# Patient Record
Sex: FEMALE | URBAN - METROPOLITAN AREA
[De-identification: names, ages, dates, MRNs, and addresses within clinical notes are randomized per-mention and may not be internally consistent; named-entity substitution may affect disease eponyms.]

---

## 2024-08-07 PROBLEM — Z86.16 PERSONAL HISTORY OF COVID-19: Status: ACTIVE | Noted: 2024-08-07

## 2024-08-07 PROBLEM — F32.A DEPRESSION: Status: ACTIVE | Noted: 2024-08-07

## 2024-08-07 PROBLEM — F14.11 COCAINE ABUSE IN REMISSION (MULTI): Status: ACTIVE | Noted: 2024-08-07

## 2024-08-07 PROBLEM — M51.26 OTHER INTERVERTEBRAL DISC DISPLACEMENT, LUMBAR REGION: Status: ACTIVE | Noted: 2024-08-07

## 2024-08-07 PROBLEM — G24.01: Status: ACTIVE | Noted: 2024-08-07

## 2024-08-07 PROBLEM — J44.9 COPD (CHRONIC OBSTRUCTIVE PULMONARY DISEASE) (MULTI): Status: ACTIVE | Noted: 2024-08-07

## 2024-08-07 PROBLEM — M62.81 MUSCLE WEAKNESS (GENERALIZED): Status: ACTIVE | Noted: 2024-08-07

## 2024-08-07 PROBLEM — F20.9 SCHIZOPHRENIA (MULTI): Status: ACTIVE | Noted: 2024-08-07

## 2024-08-07 PROBLEM — I11.0 HYPERTENSIVE HEART DISEASE WITH HEART FAILURE (MULTI): Status: ACTIVE | Noted: 2024-08-07

## 2024-08-07 PROBLEM — F31.9 BIPOLAR DISORDER, UNSPECIFIED (MULTI): Status: ACTIVE | Noted: 2024-08-07

## 2024-08-07 PROBLEM — K80.20 CALCULUS OF GALLBLADDER WITHOUT CHOLECYSTITIS WITHOUT OBSTRUCTION: Status: ACTIVE | Noted: 2024-08-07

## 2024-08-07 PROBLEM — M54.50 LOW BACK PAIN: Status: ACTIVE | Noted: 2024-08-07

## 2024-08-07 PROBLEM — M48.00 SPINAL STENOSIS: Status: ACTIVE | Noted: 2024-08-07

## 2024-08-07 PROBLEM — E11.65 TYPE 2 DIABETES MELLITUS WITH HYPERGLYCEMIA (MULTI): Status: ACTIVE | Noted: 2024-08-07

## 2024-09-06 ENCOUNTER — NURSING HOME VISIT (OUTPATIENT)
Facility: EXTERNAL LOCATION | Age: 64
End: 2024-09-06
Payer: MEDICAID

## 2024-09-06 DIAGNOSIS — E11.65 TYPE 2 DIABETES MELLITUS WITH HYPERGLYCEMIA, WITH LONG-TERM CURRENT USE OF INSULIN (MULTI): ICD-10-CM

## 2024-09-06 DIAGNOSIS — F20.3 UNDIFFERENTIATED SCHIZOPHRENIA (MULTI): ICD-10-CM

## 2024-09-06 DIAGNOSIS — Z79.4 TYPE 2 DIABETES MELLITUS WITH HYPERGLYCEMIA, WITH LONG-TERM CURRENT USE OF INSULIN (MULTI): ICD-10-CM

## 2024-09-06 DIAGNOSIS — I11.0 HYPERTENSIVE HEART DISEASE WITH HEART FAILURE (MULTI): Primary | ICD-10-CM

## 2024-09-06 PROCEDURE — 99305 1ST NF CARE MODERATE MDM 35: CPT | Performed by: INTERNAL MEDICINE

## 2024-09-06 NOTE — PROGRESS NOTES
Name: Irene Culver  YOB: 1960    INITIAL VISIT: [facility],     [facility]   She was seen and examined here at [facility] this morning.  She is resting comfortably in bed  She has no major complaints at this time  She does state that her right hip bothers her a little bit off and on  She has a history of schizophrenia and this is managed by psychiatry.    REVIEW OF SYSTEMS:  Review of systems are negative except where noted in HPI.    There were no vitals taken for this visit.   Vitals reviewed from here at nursing facility  Physical Exam  Constitutional:       Appearance: Normal appearance. She is obese.   HENT:      Head: Normocephalic and atraumatic.      Right Ear: External ear normal.      Left Ear: External ear normal.      Nose: Nose normal.      Mouth/Throat:      Mouth: Mucous membranes are moist.      Pharynx: Oropharynx is clear.   Eyes:      Extraocular Movements: Extraocular movements intact.      Conjunctiva/sclera: Conjunctivae normal.      Pupils: Pupils are equal, round, and reactive to light.   Cardiovascular:      Rate and Rhythm: Normal rate and regular rhythm.   Pulmonary:      Effort: Pulmonary effort is normal.      Breath sounds: Normal breath sounds.   Abdominal:      General: Abdomen is flat.      Palpations: Abdomen is soft.   Skin:     General: Skin is warm and dry.   Neurological:      General: No focal deficit present.      Mental Status: She is alert and oriented to person, place, and time.   Psychiatric:         Mood and Affect: Mood normal.         Behavior: Behavior normal.            CODE STATUS: Full code        LABORATORIES OR DIAGNOSTIC TESTS DONE/REVIEWED IN FACILITY:    Not on File      MEDICATIONS RECONCILED AT THE FACILITY:  Please see Nursing facility Medication EMR for full updated list.    Assessment/Plan    Problem List Items Addressed This Visit       Type 2 diabetes mellitus with hyperglycemia (Multi)    Hypertensive heart disease with heart failure  (Multi) - Primary    Schizophrenia (Multi)   Plan:  She has a history of schizophrenia and this has been diagnosed and documented in the past and she is followed and seen by psychiatry.  Her schizophrenia is managed by psychiatry.  Also has underlying diabetes on medications and this is currently treated well  On medications for her blood pressure and blood pressure is currently stable  For now continue her medical management as she is on        Cornelio Jorge DO

## 2024-12-03 ENCOUNTER — NURSING HOME VISIT (OUTPATIENT)
Facility: EXTERNAL LOCATION | Age: 64
End: 2024-12-03
Payer: MEDICAID

## 2024-12-03 DIAGNOSIS — F32.89 OTHER DEPRESSION: ICD-10-CM

## 2024-12-03 DIAGNOSIS — E11.65 TYPE 2 DIABETES MELLITUS WITH HYPERGLYCEMIA, WITH LONG-TERM CURRENT USE OF INSULIN: Primary | ICD-10-CM

## 2024-12-03 DIAGNOSIS — I11.0 HYPERTENSIVE HEART DISEASE WITH HEART FAILURE: ICD-10-CM

## 2024-12-03 DIAGNOSIS — Z79.4 TYPE 2 DIABETES MELLITUS WITH HYPERGLYCEMIA, WITH LONG-TERM CURRENT USE OF INSULIN: Primary | ICD-10-CM

## 2024-12-03 PROCEDURE — 99308 SBSQ NF CARE LOW MDM 20: CPT | Performed by: INTERNAL MEDICINE

## 2024-12-03 NOTE — LETTER
Patient: Irene Culver  : 1960    Encounter Date: 2024    Name: Irene Culver  YOB: 1960    FOLLOW UP VISIT: SNF,     SUBJECTIVE:  Patient seen and examined at [facility].  She is sitting in her wheelchair out in the common area  She denies any swelling at this time  She states she has been having some cramping episodes of her feet and hands  Also stated that she had some swallowing issues a couple of days ago but today seems to be doing well  REVIEW OF SYSTEMS:   All review of systems are negative unless otherwise stated above under subjective.    LABS REVIEWED AT FACILITY:       OBJECTIVE:  There were no vitals taken for this visit.  Vitals reviewed from here at nursing facility  Physical Exam  Constitutional:       Appearance: Normal appearance.   HENT:      Head: Normocephalic and atraumatic.      Right Ear: External ear normal.      Left Ear: External ear normal.      Nose: Nose normal.      Mouth/Throat:      Mouth: Mucous membranes are moist.      Pharynx: Oropharynx is clear.   Eyes:      Extraocular Movements: Extraocular movements intact.      Conjunctiva/sclera: Conjunctivae normal.      Pupils: Pupils are equal, round, and reactive to light.   Cardiovascular:      Rate and Rhythm: Normal rate and regular rhythm.   Pulmonary:      Effort: Pulmonary effort is normal.      Breath sounds: Normal breath sounds.   Abdominal:      General: Abdomen is flat.      Palpations: Abdomen is soft.   Musculoskeletal:      Comments: In wheelchair   Skin:     General: Skin is warm and dry.   Neurological:      General: No focal deficit present.      Mental Status: She is alert and oriented to person, place, and time.   Psychiatric:         Mood and Affect: Mood normal.         Behavior: Behavior normal.         Assessment/Plan  Problem List Items Addressed This Visit       Type 2 diabetes mellitus with hyperglycemia (Multi) - Primary    Hypertensive heart disease with heart  failure    Depression       Plan:  At this time her vitals are very stable she is on antihypertensive medications she is also on a good diabetic regimen  CODE STATUS is a full code  She seems to clinically be doing quite well with no major events currently  We will continue her medical management as she is on      Cornelio Jorge DO      Electronically Signed By: Cornelio Jorge DO   12/3/24  2:02 PM

## 2024-12-03 NOTE — PROGRESS NOTES
Name: Irene Culver  YOB: 1960    FOLLOW UP VISIT: SNF,     SUBJECTIVE:  Patient seen and examined at [facility].  She is sitting in her wheelchair out in the common area  She denies any swelling at this time  She states she has been having some cramping episodes of her feet and hands  Also stated that she had some swallowing issues a couple of days ago but today seems to be doing well  REVIEW OF SYSTEMS:   All review of systems are negative unless otherwise stated above under subjective.    LABS REVIEWED AT FACILITY:       OBJECTIVE:  There were no vitals taken for this visit.  Vitals reviewed from here at nursing facility  Physical Exam  Constitutional:       Appearance: Normal appearance.   HENT:      Head: Normocephalic and atraumatic.      Right Ear: External ear normal.      Left Ear: External ear normal.      Nose: Nose normal.      Mouth/Throat:      Mouth: Mucous membranes are moist.      Pharynx: Oropharynx is clear.   Eyes:      Extraocular Movements: Extraocular movements intact.      Conjunctiva/sclera: Conjunctivae normal.      Pupils: Pupils are equal, round, and reactive to light.   Cardiovascular:      Rate and Rhythm: Normal rate and regular rhythm.   Pulmonary:      Effort: Pulmonary effort is normal.      Breath sounds: Normal breath sounds.   Abdominal:      General: Abdomen is flat.      Palpations: Abdomen is soft.   Musculoskeletal:      Comments: In wheelchair   Skin:     General: Skin is warm and dry.   Neurological:      General: No focal deficit present.      Mental Status: She is alert and oriented to person, place, and time.   Psychiatric:         Mood and Affect: Mood normal.         Behavior: Behavior normal.         Assessment/Plan   Problem List Items Addressed This Visit       Type 2 diabetes mellitus with hyperglycemia (Multi) - Primary    Hypertensive heart disease with heart failure    Depression       Plan:  At this time her vitals are very  stable she is on antihypertensive medications she is also on a good diabetic regimen  CODE STATUS is a full code  She seems to clinically be doing quite well with no major events currently  We will continue her medical management as she is on      Cornelio Jorge, DO

## 2025-02-14 ENCOUNTER — NURSING HOME VISIT (OUTPATIENT)
Facility: EXTERNAL LOCATION | Age: 65
End: 2025-02-14
Payer: MEDICAID

## 2025-02-14 DIAGNOSIS — Z79.4 LONG TERM (CURRENT) USE OF INSULIN (MULTI): ICD-10-CM

## 2025-02-14 DIAGNOSIS — I11.0 HYPERTENSIVE HEART DISEASE WITH HEART FAILURE: ICD-10-CM

## 2025-02-14 PROCEDURE — 99308 SBSQ NF CARE LOW MDM 20: CPT | Performed by: INTERNAL MEDICINE

## 2025-02-14 NOTE — PROGRESS NOTES
Name: Irene Culver  YOB: 1960    FOLLOW UP VISIT: SNF,     SUBJECTIVE:  Patient seen and examined at the bedside  She is resting comfortably in bed this morning  She states that she is actually feeling pretty well  She has no major complaints at this time  REVIEW OF SYSTEMS:   All review of systems are negative unless otherwise stated above under subjective.    LABS REVIEWED AT FACILITY:       OBJECTIVE:  Vitals reviewed and stable  Physical Exam  Constitutional:       Appearance: Normal appearance. She is obese.   HENT:      Head: Normocephalic and atraumatic.      Right Ear: External ear normal.      Left Ear: External ear normal.      Nose: Nose normal.      Mouth/Throat:      Mouth: Mucous membranes are moist.      Pharynx: Oropharynx is clear.   Eyes:      Extraocular Movements: Extraocular movements intact.      Conjunctiva/sclera: Conjunctivae normal.      Pupils: Pupils are equal, round, and reactive to light.   Cardiovascular:      Rate and Rhythm: Normal rate and regular rhythm.   Pulmonary:      Effort: Pulmonary effort is normal.      Breath sounds: Normal breath sounds.   Abdominal:      General: Abdomen is flat.      Palpations: Abdomen is soft.   Musculoskeletal:         General: No swelling.      Comments: Resting comfortably in bed   Skin:     General: Skin is warm and dry.   Neurological:      General: No focal deficit present.      Mental Status: She is alert and oriented to person, place, and time.   Psychiatric:         Mood and Affect: Mood normal.         Behavior: Behavior normal.         Assessment/Plan   Problem List Items Addressed This Visit       Hypertensive heart disease with heart failure     Other Visit Diagnoses       Long term (current) use of insulin (Multi)            Plan:  At this time clinically she appears stable  Medications are reviewed  She is a full code  Continue medical management as she is on  No acute events or issues noted and no major complaints at  this time      Cornelio Jorge, DO

## 2025-02-14 NOTE — LETTER
Patient: Irene Culver  : 1960    Encounter Date: 2025    Name: Irene Culver  YOB: 1960    FOLLOW UP VISIT: SNF,     SUBJECTIVE:  Patient seen and examined at the bedside  She is resting comfortably in bed this morning  She states that she is actually feeling pretty well  She has no major complaints at this time  REVIEW OF SYSTEMS:   All review of systems are negative unless otherwise stated above under subjective.    LABS REVIEWED AT FACILITY:       OBJECTIVE:  Vitals reviewed and stable  Physical Exam  Constitutional:       Appearance: Normal appearance. She is obese.   HENT:      Head: Normocephalic and atraumatic.      Right Ear: External ear normal.      Left Ear: External ear normal.      Nose: Nose normal.      Mouth/Throat:      Mouth: Mucous membranes are moist.      Pharynx: Oropharynx is clear.   Eyes:      Extraocular Movements: Extraocular movements intact.      Conjunctiva/sclera: Conjunctivae normal.      Pupils: Pupils are equal, round, and reactive to light.   Cardiovascular:      Rate and Rhythm: Normal rate and regular rhythm.   Pulmonary:      Effort: Pulmonary effort is normal.      Breath sounds: Normal breath sounds.   Abdominal:      General: Abdomen is flat.      Palpations: Abdomen is soft.   Musculoskeletal:         General: No swelling.      Comments: Resting comfortably in bed   Skin:     General: Skin is warm and dry.   Neurological:      General: No focal deficit present.      Mental Status: She is alert and oriented to person, place, and time.   Psychiatric:         Mood and Affect: Mood normal.         Behavior: Behavior normal.         Assessment/Plan  Problem List Items Addressed This Visit       Hypertensive heart disease with heart failure     Other Visit Diagnoses       Long term (current) use of insulin (Multi)            Plan:  At this time clinically she appears stable  Medications are reviewed  She is a full code  Continue medical management  as she is on  No acute events or issues noted and no major complaints at this time      Cornelio Jorge DO      Electronically Signed By: Cornelio Jorge DO   2/14/25 11:19 AM

## 2025-05-06 ENCOUNTER — NURSING HOME VISIT (OUTPATIENT)
Facility: EXTERNAL LOCATION | Age: 65
End: 2025-05-06
Payer: MEDICAID

## 2025-05-06 DIAGNOSIS — M62.81 MUSCLE WEAKNESS (GENERALIZED): ICD-10-CM

## 2025-05-06 DIAGNOSIS — I11.0 HYPERTENSIVE HEART DISEASE WITH HEART FAILURE: Primary | ICD-10-CM

## 2025-05-06 DIAGNOSIS — E11.65 TYPE 2 DIABETES MELLITUS WITH HYPERGLYCEMIA, WITHOUT LONG-TERM CURRENT USE OF INSULIN: ICD-10-CM

## 2025-05-06 PROCEDURE — 99308 SBSQ NF CARE LOW MDM 20: CPT | Performed by: INTERNAL MEDICINE

## 2025-05-06 NOTE — PROGRESS NOTES
Name: Ierne Culver  YOB: 1960    FOLLOW UP VISIT: SNF,     SUBJECTIVE:  Patient seen and examined here at [facility]  She is out in the common area sitting in her wheelchair  She has no major complaints today  States she is getting everything she needs  REVIEW OF SYSTEMS:   All review of systems are negative unless otherwise stated above under subjective.    LABS REVIEWED AT FACILITY:       OBJECTIVE:  Latest blood pressure 135/78  Physical Exam  Constitutional:       Appearance: Normal appearance. She is obese.   HENT:      Head: Normocephalic and atraumatic.      Right Ear: External ear normal.      Left Ear: External ear normal.      Nose: Nose normal.      Mouth/Throat:      Mouth: Mucous membranes are moist.      Pharynx: Oropharynx is clear.   Eyes:      Extraocular Movements: Extraocular movements intact.      Conjunctiva/sclera: Conjunctivae normal.      Pupils: Pupils are equal, round, and reactive to light.   Cardiovascular:      Rate and Rhythm: Normal rate and regular rhythm.   Pulmonary:      Effort: Pulmonary effort is normal.      Breath sounds: Normal breath sounds.   Abdominal:      General: Abdomen is flat.      Palpations: Abdomen is soft.   Musculoskeletal:         General: No swelling.   Skin:     General: Skin is warm and dry.   Neurological:      General: No focal deficit present.      Mental Status: She is alert and oriented to person, place, and time.   Psychiatric:         Mood and Affect: Mood normal.         Behavior: Behavior normal.         Assessment/Plan   Problem List Items Addressed This Visit       Type 2 diabetes mellitus with hyperglycemia (Multi)    Hypertensive heart disease with heart failure - Primary    Muscle weakness (generalized)   Plan:  Clinically at this time seems to be stable  Vitals are stable  Medications reviewed  She is a full code  Continue medical management currently as she is on      Cornelio Jorge DO

## 2025-05-06 NOTE — LETTER
Patient: Irene Culver  : 1960    Encounter Date: 2025    Name: Irene Culver  YOB: 1960    FOLLOW UP VISIT: SNF,     SUBJECTIVE:  Patient seen and examined here at [facility]  She is out in the common area sitting in her wheelchair  She has no major complaints today  States she is getting everything she needs  REVIEW OF SYSTEMS:   All review of systems are negative unless otherwise stated above under subjective.    LABS REVIEWED AT FACILITY:       OBJECTIVE:  Latest blood pressure 135/78  Physical Exam  Constitutional:       Appearance: Normal appearance. She is obese.   HENT:      Head: Normocephalic and atraumatic.      Right Ear: External ear normal.      Left Ear: External ear normal.      Nose: Nose normal.      Mouth/Throat:      Mouth: Mucous membranes are moist.      Pharynx: Oropharynx is clear.   Eyes:      Extraocular Movements: Extraocular movements intact.      Conjunctiva/sclera: Conjunctivae normal.      Pupils: Pupils are equal, round, and reactive to light.   Cardiovascular:      Rate and Rhythm: Normal rate and regular rhythm.   Pulmonary:      Effort: Pulmonary effort is normal.      Breath sounds: Normal breath sounds.   Abdominal:      General: Abdomen is flat.      Palpations: Abdomen is soft.   Musculoskeletal:         General: No swelling.   Skin:     General: Skin is warm and dry.   Neurological:      General: No focal deficit present.      Mental Status: She is alert and oriented to person, place, and time.   Psychiatric:         Mood and Affect: Mood normal.         Behavior: Behavior normal.         Assessment/Plan  Problem List Items Addressed This Visit       Type 2 diabetes mellitus with hyperglycemia (Multi)    Hypertensive heart disease with heart failure - Primary    Muscle weakness (generalized)   Plan:  Clinically at this time seems to be stable  Vitals are stable  Medications reviewed  She is a full code  Continue medical  management currently as she is on      Cornelio Jorge DO    Electronically Signed By: Cornelio Jorge DO   5/6/25  9:19 AM

## 2025-07-25 ENCOUNTER — NURSING HOME VISIT (OUTPATIENT)
Facility: EXTERNAL LOCATION | Age: 65
End: 2025-07-25
Payer: MEDICAID

## 2025-07-25 DIAGNOSIS — I11.0 HYPERTENSIVE HEART DISEASE WITH HEART FAILURE: Primary | ICD-10-CM

## 2025-07-25 DIAGNOSIS — M62.81 MUSCLE WEAKNESS (GENERALIZED): ICD-10-CM

## 2025-07-25 DIAGNOSIS — J41.0 SIMPLE CHRONIC BRONCHITIS (MULTI): ICD-10-CM

## 2025-07-25 PROCEDURE — 99308 SBSQ NF CARE LOW MDM 20: CPT | Performed by: INTERNAL MEDICINE

## 2025-07-25 NOTE — LETTER
Patient: Irene Culver  : 1960    Encounter Date: 2025    Name: Irene Culver  YOB: 1960    FOLLOW UP VISIT: SNF,     SUBJECTIVE:  Patient seen and examined at [facility] for follow-up  When I saw her she was sitting in a wheelchair  She was actually eating her lunch  She is able to answer simple questions for me  She denies any major complaints currently    REVIEW OF SYSTEMS:   All review of systems are negative unless otherwise stated above under subjective.    LABS REVIEWED AT FACILITY:       OBJECTIVE:  Blood pressure is 115/65  Physical Exam  Constitutional:       Appearance: Normal appearance. She is obese.   HENT:      Head: Normocephalic and atraumatic.      Right Ear: External ear normal.      Left Ear: External ear normal.      Nose: Nose normal.      Mouth/Throat:      Mouth: Mucous membranes are moist.      Pharynx: Oropharynx is clear.     Eyes:      Extraocular Movements: Extraocular movements intact.      Conjunctiva/sclera: Conjunctivae normal.      Pupils: Pupils are equal, round, and reactive to light.       Cardiovascular:      Rate and Rhythm: Normal rate and regular rhythm.   Pulmonary:      Effort: Pulmonary effort is normal.      Breath sounds: Normal breath sounds.   Abdominal:      General: Abdomen is flat.      Palpations: Abdomen is soft.     Musculoskeletal:         General: No swelling.     Skin:     General: Skin is warm and dry.     Neurological:      General: No focal deficit present.      Mental Status: She is alert and oriented to person, place, and time.     Psychiatric:         Mood and Affect: Mood normal.         Behavior: Behavior normal.         Assessment/Plan  Problem List Items Addressed This Visit       COPD (chronic obstructive pulmonary disease) (Multi)    Hypertensive heart disease with heart failure - Primary    Muscle weakness (generalized)     Plan:  She is a full code  At this time the biggest thing is her diabetic  control  She is on multiple diabetic medications and we will continue to monitor and follow this closely  On both insulin and pills currently        Cornelio Jorge DO    Electronically Signed By: Cornelio Jorge DO   7/28/25  4:25 PM

## 2025-07-28 NOTE — PROGRESS NOTES
Name: Irene Culver  YOB: 1960    FOLLOW UP VISIT: SNF,     SUBJECTIVE:  Patient seen and examined at [facility] for follow-up  When I saw her she was sitting in a wheelchair  She was actually eating her lunch  She is able to answer simple questions for me  She denies any major complaints currently    REVIEW OF SYSTEMS:   All review of systems are negative unless otherwise stated above under subjective.    LABS REVIEWED AT FACILITY:       OBJECTIVE:  Blood pressure is 115/65  Physical Exam  Constitutional:       Appearance: Normal appearance. She is obese.   HENT:      Head: Normocephalic and atraumatic.      Right Ear: External ear normal.      Left Ear: External ear normal.      Nose: Nose normal.      Mouth/Throat:      Mouth: Mucous membranes are moist.      Pharynx: Oropharynx is clear.     Eyes:      Extraocular Movements: Extraocular movements intact.      Conjunctiva/sclera: Conjunctivae normal.      Pupils: Pupils are equal, round, and reactive to light.       Cardiovascular:      Rate and Rhythm: Normal rate and regular rhythm.   Pulmonary:      Effort: Pulmonary effort is normal.      Breath sounds: Normal breath sounds.   Abdominal:      General: Abdomen is flat.      Palpations: Abdomen is soft.     Musculoskeletal:         General: No swelling.     Skin:     General: Skin is warm and dry.     Neurological:      General: No focal deficit present.      Mental Status: She is alert and oriented to person, place, and time.     Psychiatric:         Mood and Affect: Mood normal.         Behavior: Behavior normal.         Assessment/Plan   Problem List Items Addressed This Visit       COPD (chronic obstructive pulmonary disease) (Multi)    Hypertensive heart disease with heart failure - Primary    Muscle weakness (generalized)     Plan:  She is a full code  At this time the biggest thing is her diabetic control  She is on multiple diabetic medications and we will continue to  monitor and follow this closely  On both insulin and pills currently        Cornelio Jorge, DO